# Patient Record
Sex: MALE | Race: WHITE | NOT HISPANIC OR LATINO | URBAN - METROPOLITAN AREA
[De-identification: names, ages, dates, MRNs, and addresses within clinical notes are randomized per-mention and may not be internally consistent; named-entity substitution may affect disease eponyms.]

---

## 2021-07-13 ENCOUNTER — OFFICE VISIT (OUTPATIENT)
Dept: URBAN - METROPOLITAN AREA CLINIC 27 | Facility: CLINIC | Age: 76
End: 2021-07-13

## 2021-07-13 PROBLEM — 266435005 GASTRO-ESOPHAGEAL REFLUX DISEASE WITHOUT ESOPHAGITIS: Status: ACTIVE | Noted: 2021-07-13

## 2021-07-13 PROBLEM — 428283002 HISTORY OF POLYP OF COLON (SITUATION): Status: ACTIVE | Noted: 2021-07-13

## 2021-07-13 PROBLEM — 449681000124101 LONG-TERM CURRENT USE OF ANTIPLATELET DRUG: Status: ACTIVE | Noted: 2021-07-13

## 2021-08-17 ENCOUNTER — OFFICE VISIT (OUTPATIENT)
Dept: URBAN - METROPOLITAN AREA SURGERY CENTER 7 | Facility: SURGERY CENTER | Age: 76
End: 2021-08-17

## 2022-04-30 ENCOUNTER — TELEPHONE ENCOUNTER (OUTPATIENT)
Dept: URBAN - METROPOLITAN AREA CLINIC 121 | Facility: CLINIC | Age: 77
End: 2022-04-30

## 2022-04-30 RX ORDER — PIOGLITAZONE HCL 15 MG
BID TABLET ORAL
OUTPATIENT
Start: 2016-02-29 | End: 2021-07-13

## 2022-04-30 RX ORDER — METFORMIN HCL 500 MG/1
BID TABLET ORAL
OUTPATIENT
Start: 2016-02-29

## 2022-04-30 RX ORDER — PIOGLITAZONE HCL 15 MG
BID TABLET ORAL
OUTPATIENT
Start: 2016-02-29

## 2022-04-30 RX ORDER — RABEPRAZOLE SODIUM 20 MG/1
TABLET, DELAYED RELEASE ORAL
OUTPATIENT
Start: 2010-08-18

## 2022-04-30 RX ORDER — ATENOLOL 25 MG/1
TABLET ORAL
OUTPATIENT
Start: 2010-08-18

## 2022-04-30 RX ORDER — ATENOLOL 25 MG/1
BID TABLET ORAL
OUTPATIENT
Start: 2016-02-29 | End: 2021-07-13

## 2022-04-30 RX ORDER — GLUCOSAMINE HCL/CHONDROITIN SU 500-400 MG
PRN CAPSULE ORAL
OUTPATIENT
Start: 2016-02-29 | End: 2021-07-13

## 2022-04-30 RX ORDER — PIOGLITAZONE HCL 15 MG
TABLET ORAL
OUTPATIENT
Start: 2012-11-30

## 2022-04-30 RX ORDER — HYDROCHLOROTHIAZIDE 25 MG/1
QD TABLET ORAL
OUTPATIENT
Start: 2012-12-13

## 2022-04-30 RX ORDER — ASPIRIN 81 MG
QD TABLET, DELAYED RELEASE (ENTERIC COATED) ORAL
OUTPATIENT
Start: 2012-12-13

## 2022-04-30 RX ORDER — METFORMIN HCL 500 MG/1
BID TABLET ORAL
OUTPATIENT
Start: 2016-02-29 | End: 2021-07-13

## 2022-04-30 RX ORDER — GLUCOSAMINE HCL/CHONDROITIN SU 500-400 MG
PRN CAPSULE ORAL
OUTPATIENT
Start: 2016-02-29

## 2022-04-30 RX ORDER — PIOGLITAZONE HCL 15 MG
QD TABLET ORAL
OUTPATIENT
Start: 2012-12-13

## 2022-04-30 RX ORDER — ATENOLOL 25 MG/1
BID TABLET ORAL
OUTPATIENT
Start: 2016-02-29

## 2022-04-30 RX ORDER — ATENOLOL 100 MG/1
QD TABLET ORAL
OUTPATIENT
Start: 2012-12-13

## 2022-04-30 RX ORDER — ASPIRIN 81 MG
QD TABLET, DELAYED RELEASE (ENTERIC COATED) ORAL
OUTPATIENT
Start: 2012-12-13 | End: 2021-07-13

## 2022-05-01 ENCOUNTER — TELEPHONE ENCOUNTER (OUTPATIENT)
Dept: URBAN - METROPOLITAN AREA CLINIC 121 | Facility: CLINIC | Age: 77
End: 2022-05-01

## 2022-05-01 RX ORDER — HYDROCHLOROTHIAZIDE 25 MG/1
QD TABLET ORAL
Status: ACTIVE | COMMUNITY
Start: 2012-12-13

## 2022-05-01 RX ORDER — RABEPRAZOLE SODIUM 20 MG/1
QD TABLET, DELAYED RELEASE ORAL
Status: ACTIVE | COMMUNITY
Start: 2012-12-13

## 2024-09-13 ENCOUNTER — OFFICE VISIT (OUTPATIENT)
Dept: URBAN - METROPOLITAN AREA CLINIC 27 | Facility: CLINIC | Age: 79
End: 2024-09-13
Payer: MEDICARE

## 2024-09-13 ENCOUNTER — LAB OUTSIDE AN ENCOUNTER (OUTPATIENT)
Dept: URBAN - METROPOLITAN AREA CLINIC 27 | Facility: CLINIC | Age: 79
End: 2024-09-13

## 2024-09-13 ENCOUNTER — DASHBOARD ENCOUNTERS (OUTPATIENT)
Age: 79
End: 2024-09-13

## 2024-09-13 VITALS
WEIGHT: 184 LBS | BODY MASS INDEX: 24.92 KG/M2 | HEIGHT: 72 IN | SYSTOLIC BLOOD PRESSURE: 135 MMHG | DIASTOLIC BLOOD PRESSURE: 88 MMHG | HEART RATE: 108 BPM

## 2024-09-13 DIAGNOSIS — Z86.010 PERSONAL HISTORY OF COLONIC POLYPS: ICD-10-CM

## 2024-09-13 DIAGNOSIS — K21.9 GASTRO-ESOPHAGEAL REFLUX DISEASE WITHOUT ESOPHAGITIS: ICD-10-CM

## 2024-09-13 DIAGNOSIS — I10 ESSENTIAL (PRIMARY) HYPERTENSION: ICD-10-CM

## 2024-09-13 DIAGNOSIS — E11.9 TYPE 2 DIABETES MELLITUS WITHOUT COMPLICATIONS: ICD-10-CM

## 2024-09-13 PROCEDURE — 99204 OFFICE O/P NEW MOD 45 MIN: CPT | Performed by: INTERNAL MEDICINE

## 2024-09-13 PROCEDURE — 99244 OFF/OP CNSLTJ NEW/EST MOD 40: CPT | Performed by: INTERNAL MEDICINE

## 2024-09-13 RX ORDER — HYDROCHLOROTHIAZIDE 25 MG/1
QD TABLET ORAL
Status: ACTIVE | COMMUNITY
Start: 2012-12-13

## 2024-09-13 RX ORDER — RABEPRAZOLE SODIUM 20 MG/1
QD TABLET, DELAYED RELEASE ORAL
Status: ACTIVE | COMMUNITY
Start: 2012-12-13

## 2024-09-13 NOTE — HPI-TODAY'S VISIT:
This is 79-year-old male seen in consultation at the request of Dr Jones for colon cancer screening evaluation.  His last colonoscopy was in 2021.  A 1 cm tubular adenoma was removed.  He has been doing well but states he had diarrhea a few months ago.  He took peppermint oil but now he is back to his baseline.  There was no bleeding.  He takes hypertension medicine.  He also has reflux but takes Aciphex which controls his symptoms.  No dysphagia.  He also has gout.  He states his most recent labs were normal

## 2024-10-10 ENCOUNTER — OFFICE VISIT (OUTPATIENT)
Dept: URBAN - METROPOLITAN AREA SURGERY CENTER 7 | Facility: SURGERY CENTER | Age: 79
End: 2024-10-10
Payer: MEDICARE

## 2024-10-10 ENCOUNTER — CLAIMS CREATED FROM THE CLAIM WINDOW (OUTPATIENT)
Dept: URBAN - METROPOLITAN AREA CLINIC 4 | Facility: CLINIC | Age: 79
End: 2024-10-10
Payer: MEDICARE

## 2024-10-10 DIAGNOSIS — Z12.11 COLON CANCER SCREENING: ICD-10-CM

## 2024-10-10 DIAGNOSIS — K57.30 DIVERTICULOSIS OF SIGMOID COLON: ICD-10-CM

## 2024-10-10 DIAGNOSIS — D12.4 ADENOMA OF DESCENDING COLON: ICD-10-CM

## 2024-10-10 DIAGNOSIS — D12.4 BENIGN NEOPLASM OF DESCENDING COLON: ICD-10-CM

## 2024-10-10 DIAGNOSIS — Z12.11 COLON CANCER SCREENING (HIGH RISK): ICD-10-CM

## 2024-10-10 DIAGNOSIS — D12.4 ADENOMATOUS POLYP OF DESCENDING COLON: ICD-10-CM

## 2024-10-10 DIAGNOSIS — Z86.0100 PERSONAL HISTORY OF COLONIC POLYPS: ICD-10-CM

## 2024-10-10 PROCEDURE — 00811 ANES LWR INTST NDSC NOS: CPT | Performed by: NURSE ANESTHETIST, CERTIFIED REGISTERED

## 2024-10-10 PROCEDURE — 45385 COLONOSCOPY W/LESION REMOVAL: CPT | Performed by: INTERNAL MEDICINE

## 2024-10-10 PROCEDURE — 88305 TISSUE EXAM BY PATHOLOGIST: CPT | Performed by: PATHOLOGY

## 2024-10-10 RX ORDER — HYDROCHLOROTHIAZIDE 25 MG/1
QD TABLET ORAL
Status: ACTIVE | COMMUNITY
Start: 2012-12-13

## 2024-10-10 RX ORDER — RABEPRAZOLE SODIUM 20 MG/1
QD TABLET, DELAYED RELEASE ORAL
Status: ACTIVE | COMMUNITY
Start: 2012-12-13